# Patient Record
(demographics unavailable — no encounter records)

---

## 2025-02-19 NOTE — HISTORY OF PRESENT ILLNESS
[No] : Patient does not have concerns regarding sex [Currently Active] : currently active [Men] : men [Vaginal] : vaginal [TextBox_4] : Last exam 5/2021 Menses q 28 days, lasts 5 days, no isses with them Did hpv vaccine SA, uses condoms for bc Has vascular migraines, estrogen contraindicated.